# Patient Record
Sex: FEMALE | Race: WHITE | URBAN - METROPOLITAN AREA
[De-identification: names, ages, dates, MRNs, and addresses within clinical notes are randomized per-mention and may not be internally consistent; named-entity substitution may affect disease eponyms.]

---

## 2017-06-08 ENCOUNTER — OFFICE VISIT (OUTPATIENT)
Dept: URGENT CARE | Facility: RETAIL CLINIC | Age: 52
End: 2017-06-08
Payer: COMMERCIAL

## 2017-06-08 VITALS
RESPIRATION RATE: 16 BRPM | OXYGEN SATURATION: 99 % | HEART RATE: 72 BPM | WEIGHT: 140 LBS | SYSTOLIC BLOOD PRESSURE: 114 MMHG | DIASTOLIC BLOOD PRESSURE: 77 MMHG | TEMPERATURE: 97.2 F

## 2017-06-08 DIAGNOSIS — H60.503 ACUTE OTITIS EXTERNA OF BOTH EARS, UNSPECIFIED TYPE: Primary | ICD-10-CM

## 2017-06-08 DIAGNOSIS — H92.03 EAR PAIN, BILATERAL: ICD-10-CM

## 2017-06-08 PROCEDURE — 99203 OFFICE O/P NEW LOW 30 MIN: CPT | Performed by: NURSE PRACTITIONER

## 2017-06-08 RX ORDER — CETIRIZINE HYDROCHLORIDE 10 MG/1
10 TABLET ORAL DAILY
COMMUNITY

## 2017-06-08 RX ORDER — PREDNISONE 20 MG/1
40 TABLET ORAL DAILY
Qty: 10 TABLET | Refills: 0 | Status: SHIPPED | OUTPATIENT
Start: 2017-06-08 | End: 2017-06-13

## 2017-06-08 RX ORDER — NEOMYCIN SULFATE, POLYMYXIN B SULFATE AND HYDROCORTISONE 10; 3.5; 1 MG/ML; MG/ML; [USP'U]/ML
4 SUSPENSION/ DROPS AURICULAR (OTIC) 4 TIMES DAILY
Qty: 10 ML | Refills: 0 | Status: SHIPPED | OUTPATIENT
Start: 2017-06-08

## 2017-06-08 NOTE — PROGRESS NOTES
SUBJECTIVE:  Eleanor Lund is a 52 year old female who presents with bilateral ear pain, discharge and pressure for 1.5 week(s).   Severity: moderate   Timing:gradual onset and worsening  Additional symptoms include ear pain, headache and sore throat.      History of recurrent otitis: no    No past medical history on file.  Current Outpatient Prescriptions   Medication Sig Dispense Refill     cetirizine (ZYRTEC) 10 MG tablet Take 10 mg by mouth daily       Escitalopram Oxalate (LEXAPRO PO) Take 20 mg by mouth       OMEPRAZOLE MAGNESIUM PO        History   Smoking Status     Never Smoker   Smokeless Tobacco     Not on file     ROS:   Review of systems negative except as stated above.    OBJECTIVE:  /77 (BP Location: Right arm, Patient Position: Chair, Cuff Size: Adult Regular)  Pulse 72  Temp 97.2  F (36.2  C) (Oral)  Resp 16  Wt 140 lb (63.5 kg)  SpO2 99%  The right TM is normal: no effusions, no erythema, and normal landmarks     The right auditory canal is erythematous, swollen, tender and some clear drainage  The left TM is normal: no effusions, no erythema, and normal landmarks  The left auditory canal is mildly swollen, tender  Oropharynx exam is normal: no lesions, erythema, adenopathy or exudate.  GENERAL: alert and mild distress  EYES: EOMI,  PERRL, conjunctiva clear  NECK: supple, non-tender to palpation, no adenopathy noted  RESP: lungs clear to auscultation - no rales, rhonchi or wheezes  CV: regular rates and rhythm, normal S1 S2, no murmur noted  ABDOMEN: soft, normal bowel sounds  SKIN: no suspicious lesions or rashes     ASSESSMENT:     Acute otitis externa of both ears, unspecified type  Ear pain, bilateral      PLAN:  Current Outpatient Prescriptions   Medication     cetirizine (ZYRTEC) 10 MG tablet     Escitalopram Oxalate (LEXAPRO PO)     OMEPRAZOLE MAGNESIUM PO     neomycin-polymyxin-hydrocortisone (CORTISPORIN) 3.5-88734-7 otic suspension     predniSONE (DELTASONE) 20 MG tablet      No current facility-administered medications for this visit.      Drops given for outer ear infections should be taken as directed.    Acetaminophen or ibuprofen can be used to help with the earache.     Place warm moist hear or a heating pad on ear for comfort, remove the heat in 10 to 20 minutes to prevent burn.  Plugged or clogged feeling in the ear may persist for a short time.  If no infection should monitor for a change in symptoms, as ear infections can develop rapidly.  Should also be seen if no improvement or worsening with in 48 hours.    Slade Hussein MSN, APRN, Family NP-C  [facility]

## 2017-06-08 NOTE — NURSING NOTE
Chief Complaint   Patient presents with     Otalgia     bilater ear discomfort with drainage, drainage x one and half weeks       Initial /77 (BP Location: Right arm, Patient Position: Chair, Cuff Size: Adult Regular)  Pulse 72  Temp 97.2  F (36.2  C) (Oral)  Resp 16  Wt 140 lb (63.5 kg)  SpO2 99% There is no height or weight on file to calculate BMI.  Medication Reconciliation: complete     Jessica Sundet

## 2017-06-08 NOTE — MR AVS SNAPSHOT
"              After Visit Summary   2017    Eleanor Lund    MRN: [id number]           Patient Information     Date Of Birth          1965        Visit Information        Provider Department      2017 5:40 PM Slade Hussein APRN [facility]        Today's Diagnoses     Acute otitis externa of both ears, unspecified type    -  1    Ear pain, bilateral           Follow-ups after your visit        Who to contact     You can reach your care team any time of the day by calling [phone].  Notification of test results:  If you have an abnormal lab result, we will notify you by phone as soon as possible.         Additional Information About Your Visit        MyChart Information     [employer] lets you send messages to your doctor, view your test results, renew your prescriptions, schedule appointments and more. To sign up, go to [email or web address] . Click on \"Log in\" on the left side of the screen, which will take you to the Welcome page. Then click on \"Sign up Now\" on the right side of the page.     You will be asked to enter the access code listed below, as well as some personal information. Please follow the directions to create your username and password.     Your access code is: [email or web address]  Expires: 2017  5:44 PM     Your access code will  in 90 days. If you need help or a new code, please call your [place] clinic or [phone].        Care EveryWhere ID     This is your [facility] EveryWhere ID. This could be used by other organizations to access your [place] medical records  [id number]        Your Vitals Were     Pulse Temperature Respirations Pulse Oximetry          72 97.2  F (36.2  C) (Oral) 16 99%         Blood Pressure from Last 3 Encounters:   17 114/77    Weight from Last 3 Encounters:   17 140 lb (63.5 kg)              Today, you had the following     No orders found for display         Today's Medication Changes          These changes " are accurate as of: 6/8/17  5:44 PM.  If you have any questions, ask your nurse or doctor.               Start taking these medicines.        Dose/Directions    neomycin-polymyxin-hydrocortisone 3.5-87493-4 otic suspension   Commonly known as:  CORTISPORIN   Used for:  Acute otitis externa of both ears, unspecified type   Started by:  Slade Hussein APRN CNP        Dose:  4 drop   Place 4 drops in ear(s) 4 times daily   Quantity:  10 mL   Refills:  0       predniSONE 20 MG tablet   Commonly known as:  DELTASONE   Used for:  Acute otitis externa of both ears, unspecified type   Started by:  Slade Hussein APRN CNP        Dose:  40 mg   Take 2 tablets (40 mg) by mouth daily for 5 days   Quantity:  10 tablet   Refills:  0            Where to get your medicines      These medications were sent to [address]  [address] [facility] [address]     Phone:  [phone]     neomycin-polymyxin-hydrocortisone 3.5-75106-3 otic suspension    predniSONE 20 MG tablet                Primary Care Provider    None Specified       No primary provider on file.        Thank you!     Thank you for choosing [facility]  for your care. Our goal is always to provide you with excellent care. Hearing back from our patients is one way we can continue to improve our services. Please take a few minutes to complete the written survey that you may receive in the mail after your visit with us. Thank you!             Your Updated Medication List - Protect others around you: Learn how to safely use, store and throw away your medicines at www.disposemymeds.org.          This list is accurate as of: 6/8/17  5:44 PM.  Always use your most recent med list.                   Brand Name Dispense Instructions for use    cetirizine 10 MG tablet    zyrTEC     Take 10 mg by mouth daily       LEXAPRO PO      Take 20 mg by mouth       neomycin-polymyxin-hydrocortisone 3.5-43332-0 otic suspension     CORTISPORIN    10 mL    Place 4 drops in ear(s) 4 times daily       OMEPRAZOLE MAGNESIUM PO          predniSONE 20 MG tablet    DELTASONE    10 tablet    Take 2 tablets (40 mg) by mouth daily for 5 days